# Patient Record
Sex: FEMALE | Race: BLACK OR AFRICAN AMERICAN
[De-identification: names, ages, dates, MRNs, and addresses within clinical notes are randomized per-mention and may not be internally consistent; named-entity substitution may affect disease eponyms.]

---

## 2016-09-07 VITALS — DIASTOLIC BLOOD PRESSURE: 62 MMHG | SYSTOLIC BLOOD PRESSURE: 103 MMHG

## 2018-02-06 ENCOUNTER — HOSPITAL ENCOUNTER (OUTPATIENT)
Dept: HOSPITAL 63 - US | Age: 68
Discharge: HOME | End: 2018-02-06
Attending: FAMILY MEDICINE
Payer: MEDICARE

## 2018-02-06 DIAGNOSIS — N32.89: ICD-10-CM

## 2018-02-06 DIAGNOSIS — R94.4: Primary | ICD-10-CM

## 2018-02-06 PROCEDURE — 76770 US EXAM ABDO BACK WALL COMP: CPT

## 2018-02-06 NOTE — RAD
Examination: Ultrasound kidneys



History: History of left flank pain for one week



Comparison: None available



Findings:



The right kidney measures 10.7 x 5.3 x 4.5 cm. The left kidney measures 10.5 x

5.8 x4.8 cm.



Bilateral ureteral jets are visualized in the urinary bladder.



Urinary bladder is mildly distended.



Impression:



Unremarkable visualized exam.

## 2021-09-30 ENCOUNTER — HOSPITAL ENCOUNTER (OUTPATIENT)
Dept: HOSPITAL 63 - ER | Age: 71
Setting detail: OBSERVATION
LOS: 1 days | Discharge: HOME | End: 2021-10-01
Attending: FAMILY MEDICINE | Admitting: FAMILY MEDICINE
Payer: COMMERCIAL

## 2021-09-30 VITALS — HEIGHT: 62 IN | BODY MASS INDEX: 36.31 KG/M2 | WEIGHT: 197.31 LBS

## 2021-09-30 VITALS — DIASTOLIC BLOOD PRESSURE: 76 MMHG | SYSTOLIC BLOOD PRESSURE: 118 MMHG

## 2021-09-30 VITALS — DIASTOLIC BLOOD PRESSURE: 61 MMHG | SYSTOLIC BLOOD PRESSURE: 105 MMHG

## 2021-09-30 DIAGNOSIS — I50.9: ICD-10-CM

## 2021-09-30 DIAGNOSIS — H26.9: ICD-10-CM

## 2021-09-30 DIAGNOSIS — R77.8: ICD-10-CM

## 2021-09-30 DIAGNOSIS — H40.9: ICD-10-CM

## 2021-09-30 DIAGNOSIS — Z93.3: ICD-10-CM

## 2021-09-30 DIAGNOSIS — M19.90: ICD-10-CM

## 2021-09-30 DIAGNOSIS — I11.0: ICD-10-CM

## 2021-09-30 DIAGNOSIS — E66.9: ICD-10-CM

## 2021-09-30 DIAGNOSIS — Z95.810: ICD-10-CM

## 2021-09-30 DIAGNOSIS — I47.1: Primary | ICD-10-CM

## 2021-09-30 DIAGNOSIS — E78.00: ICD-10-CM

## 2021-09-30 DIAGNOSIS — I25.10: ICD-10-CM

## 2021-09-30 DIAGNOSIS — E78.5: ICD-10-CM

## 2021-09-30 DIAGNOSIS — Z20.822: ICD-10-CM

## 2021-09-30 DIAGNOSIS — Z85.048: ICD-10-CM

## 2021-09-30 DIAGNOSIS — E11.9: ICD-10-CM

## 2021-09-30 LAB
ALBUMIN SERPL-MCNC: 3.5 G/DL (ref 3.4–5)
ALBUMIN/GLOB SERPL: 0.9 {RATIO} (ref 1–1.7)
ALP SERPL-CCNC: 73 U/L (ref 46–116)
ALT SERPL-CCNC: 27 U/L (ref 14–59)
ANION GAP SERPL CALC-SCNC: 9 MMOL/L (ref 6–14)
APTT PPP: YELLOW S
AST SERPL-CCNC: 19 U/L (ref 15–37)
BACTERIA #/AREA URNS HPF: 0 /HPF
BASOPHILS # BLD AUTO: 0 X10^3/UL (ref 0–0.2)
BASOPHILS NFR BLD: 0 % (ref 0–3)
BILIRUB SERPL-MCNC: 0.3 MG/DL (ref 0.2–1)
BILIRUB UR QL STRIP: (no result)
BUN/CREAT SERPL: 21 (ref 6–20)
CA-I SERPL ISE-MCNC: 19 MG/DL (ref 7–20)
CALCIUM SERPL-MCNC: 9.3 MG/DL (ref 8.5–10.1)
CHLORIDE SERPL-SCNC: 106 MMOL/L (ref 98–107)
CO2 SERPL-SCNC: 27 MMOL/L (ref 21–32)
CREAT SERPL-MCNC: 0.9 MG/DL (ref 0.6–1)
EOSINOPHIL NFR BLD: 0 % (ref 0–3)
EOSINOPHIL NFR BLD: 0 X10^3/UL (ref 0–0.7)
ERYTHROCYTE [DISTWIDTH] IN BLOOD BY AUTOMATED COUNT: 13.5 % (ref 11.5–14.5)
FIBRINOGEN PPP-MCNC: CLEAR MG/DL
GFR SERPLBLD BASED ON 1.73 SQ M-ARVRAT: 74.7 ML/MIN
GLOBULIN SER-MCNC: 3.7 G/DL (ref 2.2–3.8)
GLUCOSE SERPL-MCNC: 83 MG/DL (ref 70–99)
GLUCOSE UR STRIP-MCNC: (no result) MG/DL
HCT VFR BLD CALC: 39.4 % (ref 36–47)
HGB BLD-MCNC: 13.2 G/DL (ref 12–15.5)
HYALINE CASTS #/AREA URNS LPF: (no result) /HPF
LYMPHOCYTES # BLD: 1.9 X10^3/UL (ref 1–4.8)
LYMPHOCYTES NFR BLD AUTO: 18 % (ref 24–48)
MCH RBC QN AUTO: 33 PG (ref 25–35)
MCHC RBC AUTO-ENTMCNC: 33 G/DL (ref 31–37)
MCV RBC AUTO: 100 FL (ref 79–100)
MONO #: 0.8 X10^3/UL (ref 0–1.1)
MONOCYTES NFR BLD: 8 % (ref 0–9)
NEUT #: 7.8 X10^3UL (ref 1.8–7.7)
NEUTROPHILS NFR BLD AUTO: 74 % (ref 31–73)
NITRITE UR QL STRIP: (no result)
PLATELET # BLD AUTO: 268 X10^3/UL (ref 140–400)
POTASSIUM SERPL-SCNC: 3.7 MMOL/L (ref 3.5–5.1)
PROT SERPL-MCNC: 7.2 G/DL (ref 6.4–8.2)
RBC # BLD AUTO: 3.96 X10^6/UL (ref 3.5–5.4)
RBC #/AREA URNS HPF: 0 /HPF (ref 0–2)
SODIUM SERPL-SCNC: 142 MMOL/L (ref 136–145)
SP GR UR STRIP: 1.02
SQUAMOUS #/AREA URNS LPF: (no result) /LPF
UROBILINOGEN UR-MCNC: 0.2 MG/DL
WBC # BLD AUTO: 10.6 X10^3/UL (ref 4–11)
WBC #/AREA URNS HPF: (no result) /HPF (ref 0–4)
YEAST #/AREA URNS HPF: PRESENT /HPF

## 2021-09-30 PROCEDURE — 93005 ELECTROCARDIOGRAM TRACING: CPT

## 2021-09-30 PROCEDURE — 96374 THER/PROPH/DIAG INJ IV PUSH: CPT

## 2021-09-30 PROCEDURE — G0379 DIRECT REFER HOSPITAL OBSERV: HCPCS

## 2021-09-30 PROCEDURE — 99285 EMERGENCY DEPT VISIT HI MDM: CPT

## 2021-09-30 PROCEDURE — 71045 X-RAY EXAM CHEST 1 VIEW: CPT

## 2021-09-30 PROCEDURE — 80053 COMPREHEN METABOLIC PANEL: CPT

## 2021-09-30 PROCEDURE — U0003 INFECTIOUS AGENT DETECTION BY NUCLEIC ACID (DNA OR RNA); SEVERE ACUTE RESPIRATORY SYNDROME CORONAVIRUS 2 (SARS-COV-2) (CORONAVIRUS DISEASE [COVID-19]), AMPLIFIED PROBE TECHNIQUE, MAKING USE OF HIGH THROUGHPUT TECHNOLOGIES AS DESCRIBED BY CMS-2020-01-R: HCPCS

## 2021-09-30 PROCEDURE — 96361 HYDRATE IV INFUSION ADD-ON: CPT

## 2021-09-30 PROCEDURE — 36415 COLL VENOUS BLD VENIPUNCTURE: CPT

## 2021-09-30 PROCEDURE — G0378 HOSPITAL OBSERVATION PER HR: HCPCS

## 2021-09-30 PROCEDURE — 84484 ASSAY OF TROPONIN QUANT: CPT

## 2021-09-30 PROCEDURE — 87426 SARSCOV CORONAVIRUS AG IA: CPT

## 2021-09-30 PROCEDURE — 85025 COMPLETE CBC W/AUTO DIFF WBC: CPT

## 2021-09-30 PROCEDURE — 81001 URINALYSIS AUTO W/SCOPE: CPT

## 2021-09-30 RX ADMIN — Medication SCH MG: at 21:34

## 2021-09-30 NOTE — RAD
Study: XR CHEST 1V



Indication: Chest pain.



Comparison: 9/7/2016



Findings:



Left chest wall multilead pacer/AICD.



Unchanged configuration of the cardiomediastinal silhouette and debbie. Mildly increased basilar lung m
arkings favored in part from mild atelectasis. Linear density at the left lower lung is unchanged. Re
demonstrated blunting of left costophrenic angle. No lobar consolidation or pneumothorax.



Advanced arthrosis of both glenohumeral joints.



Impression:



No acute radiographic abnormality of the chest. Unchanged blunting of the left costophrenic angle fro
m a 2016 comparison is favored pleural thickening/epiphrenic fat as opposed to a small pleural effusi
on.



Electronically signed by: STAR BACH MD (9/30/2021 2:19 PM) [facility]

## 2021-09-30 NOTE — PHYS DOC
Past History


Past Medical History:  Cancer, CHF, Diabetes, Glaucoma, High Cholesterol, Heart 

Disease, Other


Additional Past Medical Histor:  cardiomyopathy,colorectal ca


Past Surgical History:  Other


Additional Past Surgical Histo:  pacer/drfib, colostomy bag andremoval


Alcohol Use:  None


Drug Use:  None





General Adult


EDM:


Chief Complaint:  AICD FIRED OR SHOCKED





HPI:


HPI:


71-year-old female presents after her AICD went off 3 times.  Patient was having

a verbal argument with her brother on the phone when she had the first shock.  

It went off 2 more times over the next couple of minutes.  She has not had a 

shock since that time.  This was about 30 minutes prior to arrival.  The patient

has never had her AICD go off before.  She sees a cardiologist at  regularly. 

Her pacer with defibrillator was installed in 2017.  She is unsure if she is 

paced full-time.  She denies fever or chills.  She states that she was feeling 

completely normal prior to this episode.





Review of Systems:


Review of Systems:


Constitutional:  Denies fever or chills 


Eyes:  Denies change in visual acuity 


HENT:  Denies nasal congestion or sore throat 


Respiratory:  Denies cough or shortness of breath 


Cardiovascular: AICD fired


GI:  Denies abdominal pain, nausea, vomiting, bloody stools or diarrhea 


: Denies dysuria 


Musculoskeletal:  Denies back pain or joint pain 


Integument:  Denies rash 


Neurologic:  Denies headache, focal weakness or sensory changes 


Endocrine:  Denies polyuria or polydipsia 


Lymphatic:  Denies swollen glands 


Psychiatric:  Denies depression or anxiety





Allergies:


Allergies:





Allergies








Coded Allergies Type Severity Reaction Last Updated Verified


 


  morphine Allergy Intermediate  9/30/21 Yes











Physical Exam:


PE:





Constitutional: Well developed, well nourished, obese, no acute distress, non-

toxic appearance. []


HENT: Normocephalic, atraumatic, bilateral external ears normal, oropharynx 

moist, no oral exudates, nose normal. []


Eyes: PERRLA, EOMI, conjunctiva normal, no discharge. [] 


Neck: Normal range of motion, no tenderness, supple, no stridor. [] 


Cardiovascular: Heart rate 108, paced rhythm, no murmur []


Lungs & Thorax:  Bilateral breath sounds clear to auscultation []


Abdomen: Bowel sounds normal, soft, no tenderness, no masses, no pulsatile 

masses. [] 


Skin: Warm, dry, no erythema, no rash. [] 


Back: No tenderness, no CVA tenderness. [] 


Extremities: No tenderness, no cyanosis, no clubbing, ROM intact, no edema. [] 


Neurologic: Alert and oriented X 3, normal motor function, normal sensory 

function, no focal deficits noted. []


Psychologic: Affect normal, judgement normal, mood normal. []





Current Patient Data:


Vital Signs:





                                   Vital Signs








  Date Time  Temp Pulse Resp B/P (MAP) Pulse Ox O2 Delivery O2 Flow Rate FiO2


 


9/30/21 12:05 98.6 103 22 142/91 (108) 100 Room Air  











EKG:


EKG:


Paced rhythm, rate 108, no significant ST elevation or depression.  []





Radiology/Procedures:


Radiology/Procedures:


[]


Impressions:


Study: XR CHEST 1V





Indication: Chest pain.





Comparison: 9/7/2016





Findings:





Left chest wall multilead pacer/AICD.





Unchanged configuration of the cardiomediastinal silhouette and debbie. Mildly 

increased basilar lung markings favored in part from mild atelectasis. Linear 

density at the left lower lung is unchanged. Redemonstrated blunting of left 

costophrenic angle. No lobar consolidation or pneumothorax.





Advanced arthrosis of both glenohumeral joints.





Impression:





No acute radiographic abnormality of the chest. Unchanged blunting of the left 

costophrenic angle from a 2016 comparison is favored pleural 

thickening/epiphrenic fat as opposed to a small pleural effusion.





Electronically signed by: STAR BACH MD (9/30/2021 2:19 PM) [facility]














DICTATED AND SIGNED BY:     STAR BACH MD


DATE:     09/30/21 1417





CC: PRINCESS LEÓN DO; ANTHONY DEMARCO MD ~MTH0 0





Heart Score:


C/O Chest Pain:  No


Risk Factors:


Risk Factors:  DM, Current or recent (<one month) smoker, HTN, HLP, family 

history of CAD, obesity.


Risk Scores:


Score 0 - 3:  2.5% MACE over next 6 weeks - Discharge Home


Score 4 - 6:  20.3% MACE over next 6 weeks - Admit for Clinical Observation


Score 7 - 10:  72.7% MACE over next 6 weeks - Early Invasive Strategies





Course & Med Decision Making:


Course & Med Decision Making


Pertinent Labs and Imaging studies reviewed. (See chart for details)


Patient's labs are unremarkable.  Her chest x-ray is negative for acute 

findings.  We interrogated the patient's AICD pacemaker and she seems to have 

had several episodes of SVT.  This would be consistent with how things look on 

the monitor at this time.  I have given her 20 of Cardizem and her heart rate is

 improved 85.  I consulted cardiology and spoke with the nurse practitioner, 

Taryn and she is recommended 50 mg of metoprolol and overnight admission for 

observation and management.  I spoke with Dr. Demarco and he has accepted the 

patient for admission.  I also spoke to the nurse for the patient's 

cardiologist, Dr. Roblero, and she has agreed with the plan of action.  We can 

consult them if needed.


[]





Dragon Disclaimer:


Dragon Disclaimer:


This electronic medical record was generated, in whole or in part, using a voice

 recognition dictation system.





Departure


Departure:


Impression:  


   Primary Impression:  


   SVT (supraventricular tachycardia)


   Additional Impression:  


   AICD discharge


Disposition:  09 ADMITTED AS INPATIENT


Admitting Physician:  Anthony Demarco


Condition:  STABLE


Referrals:  


ANTHONY DEMARCO MD (PCP)











PRINCESS LEÓN DO                 Sep 30, 2021 12:25

## 2021-09-30 NOTE — EKG
[facility] [address], [place] [address]

Test Date:    2021               Test Time:    13:24:00

Pat Name:     ESTIVEN CHICAS            Department:   

Patient ID:   [id number]           Room:          

Gender:       F                        Technician:   

:          [id number]               Requested By: PRINCESS LEÓN

Order Number: [id number]            Reading MD:     

                                 Measurements

Intervals                              Axis          

Rate:         101                      P:            41

AR:           124                      QRS:          142

QRSD:         80                       T:            37

QT:           392                                    

QTc:          509                                    

                           Interpretive Statements

SINUS TACHYCARDIA

ABNORMAL RIGHT AXIS DEVIATION

ST & T ABNORMALITY, CONSIDER RECENT

INFERIOR MYOCARDIAL OR PERICARDIAL DAMAGE

ABNORMAL ECG

RI6.02

No previous ECG available for comparison

## 2021-09-30 NOTE — EKG
[facility] [address], [place] [address]

Test Date:    2021               Test Time:    11:59:00

Pat Name:     ESTIVEN CHICAS            Department:   

Patient ID:   [id number]           Room:          

Gender:       F                        Technician:   

:          [id number]               Requested By: PRINCESS LEÓN

Order Number: [id number]            Reading MD:     

                                 Measurements

Intervals                              Axis          

Rate:         108                      P:            31

WV:           122                      QRS:          136

QRSD:         128                      T:            19

QT:           378                                    

QTc:          511                                    

                           Interpretive Statements

PACEMAKER SPIKES NOTED

RI6.02

No previous ECG available for comparison

## 2021-10-01 VITALS — DIASTOLIC BLOOD PRESSURE: 95 MMHG | SYSTOLIC BLOOD PRESSURE: 152 MMHG

## 2021-10-01 VITALS — SYSTOLIC BLOOD PRESSURE: 94 MMHG | DIASTOLIC BLOOD PRESSURE: 61 MMHG

## 2021-10-01 VITALS — DIASTOLIC BLOOD PRESSURE: 73 MMHG | SYSTOLIC BLOOD PRESSURE: 117 MMHG

## 2021-10-01 RX ADMIN — Medication SCH MG: at 08:44

## 2021-10-01 NOTE — NUR
PATIENT IS DISCHARGED HOME, DISCHARGED INSTRUCTION REVIEWED, PATIENT VERBALIZED 
UNDERSTANDING. PATIENT LEFT ROOM 113 VIA AMBULATION. PATIENT IS TAKEN HOME BY FRIEND VIA 
PERSONAL TRANSPORTATION.

## 2021-10-01 NOTE — PDOC2
CONSULT


DOS:


DATE: 10/1/21 


TIME: 14:17


Reason for Consult:


AICD discharge.


Referring Physician:


Dr. Sanchez


Chief Complaint


AICD discharge


Source:  Chart review, Patient


Problem List


Problems


Medical Problems:


(1) AICD discharge


Status: Acute  





(2) SVT (supraventricular tachycardia)


Status: Acute  








History of Present Illness


The patient is a 71-year-old female who was feeling in her usual state of health

last evening. Then she reportedly got into an argument with a family member and 

during this argument she had her AICD discharge 2 or 3 times. She was brought to

the emergency room. Chest x-ray showed an AICD in place but no acute 

cardiopulmonary changes. Initial potassium of 3.7. Troponin has been 0.042 and 

0.124. EKG showed a sinus rhythm with V paced beats. The patient reportedly had 

her device interrogated and showed episodes of PSVT but no ventricular t

achycardia. This morning she is comfortable. She denies chest pain, shortness of

breath, dizziness or lightheadedness. She has had no significant arrhythmias 

overnight.


Cardiovascular:  CAD, CHF, HTN, hyperipidemia


Heme/Onc:  Cancer


Endocrine:  Diabetes


Past Surgical History:  Other (AICD, colostomy)


Family History:  Hypertension


Smoke:  No


ALCOHOL:  none


Current Medications





Current Medications


Sodium Chloride 1,000 ml @  1,000 mls/hr 1X  ONCE IV  Last administered on 

9/30/21at 13:51;  Start 9/30/21 at 13:45;  Stop 9/30/21 at 14:44;  Status DC


Diltiazem HCl (Cardizem Iv Push) 20 mg 1X  ONCE IVP  Last administered on 

9/30/21at 15:19;  Start 9/30/21 at 15:15;  Stop 9/30/21 at 15:16;  Status DC


Metoprolol Succinate (Toprol Xl) 50 mg 1X  ONCE PO  Last administered on 

9/30/21at 16:36;  Start 9/30/21 at 16:15;  Stop 9/30/21 at 16:18;  Status DC


Metoprolol Succinate (Toprol Xl) 25 mg DAILY PO  Last administered on 10/1/21at 

08:44;  Start 10/1/21 at 09:00;  Stop 10/1/21 at 11:58;  Status DC


Non-Formulary Medication (Doxycycline Hyclate ) 100 mg BID PO  Last administered

on 10/1/21at 08:44;  Start 9/30/21 at 21:00;  Stop 10/1/21 at 11:58;  Status DC


Methylprednisolone (Medrol) 4 mg DAILY PO  Last administered on 10/1/21at 08:44;

 Start 10/1/21 at 09:00;  Stop 10/1/21 at 11:58;  Status DC


Promethazine HCl/ Codeine (Phenergan With Codeine Oral Syrup) 5 ml PRN Q4HRS  

PRN PO COUGH;  Start 9/30/21 at 21:15;  Stop 10/1/21 at 11:58;  Status DC


Non-Formulary Medication (Sacubitril/ Valsartan (Entresto 97 mg-103 mg Tablet)) 

1 each BID PO  Last administered on 10/1/21at 08:45;  Start 9/30/21 at 21:00;  

Stop 10/1/21 at 11:58;  Status DC


Spironolactone (Aldactone) 50 mg DAILY PO  Last administered on 10/1/21at 08:44;

 Start 10/1/21 at 09:00;  Stop 10/1/21 at 11:58;  Status DC


Zolpidem Tartrate (Ambien) 5 mg PRN QHS  PRN PO INSOMNIA, MAY REPEAT IN 1HR Last

administered on 9/30/21at 21:34;  Start 9/30/21 at 21:15;  Stop 10/1/21 at 

11:58;  Status DC


Dorzolamide/ Timolol (Cosopt) 1 drop BID OU  Last administered on 10/1/21at 

10:15;  Start 10/1/21 at 09:00;  Stop 10/1/21 at 11:58;  Status DC


Brimonidine Tartrate (Alphagan) 1 drop BID OU  Last administered on 10/1/21at 

10:15;  Start 10/1/21 at 09:00;  Stop 10/1/21 at 11:58;  Status DC


Latanoprost (Xalatan) 1 drop QHS OU ;  Start 10/1/21 at 21:00;  Stop 10/1/21 at 

11:58;  Status DC


Non-Formulary Medication (Doxycycline Hyclate ) 1 cap BID PO ;  Start 10/1/21 at

21:00;  Stop 10/1/21 at 11:58;  Status DC





Active Scripts


Active


Reported


Travatan Z (Travoprost) 5 Ml Drops 1 Drop EACHEYE QHS


Alphagan P (Brimonidine Tartrate) 5 Ml Drops 1 Drop EACHEYE BID


Dorzolamide-Timolol Eye Drops (Dorzolamide Hcl/Timolol Maleat) 10 Ml Drops 1 

Drop EACHEYE BID


Doxycycline Hyclate 100 Mg Capsule 1 Cap PO BID


Metoprolol Succinate ( Xl ) (Metoprolol Succinate) 25 Mg Tab.er.24h 1 Tab PO 

DAILY PRN


Prometh-Codein 6.25-10 mg/5 ml (Promethazine HCl/Codeine) 5 Ml Syrup 5 Ml PO PRN

Q4-6HRS PRN MDD 30 Milliliter(s)


Spironolactone 50 Mg Tablet 1 Tab PO DAILY


Methylprednisolone 4 Mg Tab.ds.pk 4 Mg PO DAILY


Entresto 97 mg-103 mg Tablet (Sacubitril/Valsartan) 1 Each Tablet 1 Each PO BID


Allergies:  


Coded Allergies:  


     morphine (Verified  Allergy, Intermediate, 9/30/21)


   Halluinations


Cardiovascular:  yes: Other (AICD discharge)


General:  No acute distress


HEENT:  Atraumatic


Lungs:  Clear to auscultation


Heart:  Regular rate


Abdomen:  Normal bowel sounds


VITALS





Vital Signs








  Date Time  Temp Pulse Resp B/P (MAP) Pulse Ox O2 Delivery O2 Flow Rate FiO2


 


10/1/21 11:22 97.5 79 18 152/95 (114)    


 


10/1/21 08:22      Room Air  


 


10/1/21 05:35     100   








Labs





Laboratory Tests








Test


 9/30/21


13:43 9/30/21


15:37 9/30/21


16:40 9/30/21


19:34


 


White Blood Count


 10.6 x10^3/uL


(4.0-11.0) 


 


 





 


Red Blood Count


 3.96 x10^6/uL


(3.50-5.40) 


 


 





 


Hemoglobin


 13.2 g/dL


(12.0-15.5) 


 


 





 


Hematocrit


 39.4 %


(36.0-47.0) 


 


 





 


Mean Corpuscular Volume


 100 fL


() 


 


 





 


Mean Corpuscular Hemoglobin 33 pg (25-35)    


 


Mean Corpuscular Hemoglobin


Concent 33 g/dL


(31-37) 


 


 





 


Red Cell Distribution Width


 13.5 %


(11.5-14.5) 


 


 





 


Platelet Count


 268 x10^3/uL


(140-400) 


 


 





 


Neutrophils (%) (Auto) 74 % (31-73)    


 


Lymphocytes (%) (Auto) 18 % (24-48)    


 


Monocytes (%) (Auto) 8 % (0-9)    


 


Eosinophils (%) (Auto) 0 % (0-3)    


 


Basophils (%) (Auto) 0 % (0-3)    


 


Neutrophils # (Auto)


 7.8 x10^3uL


(1.8-7.7) 


 


 





 


Lymphocytes # (Auto)


 1.9 x10^3/uL


(1.0-4.8) 


 


 





 


Monocytes # (Auto)


 0.8 x10^3/uL


(0.0-1.1) 


 


 





 


Eosinophils # (Auto)


 0.0 x10^3/uL


(0.0-0.7) 


 


 





 


Basophils # (Auto)


 0.0 x10^3/uL


(0.0-0.2) 


 


 





 


Sodium Level


 142 mmol/L


(136-145) 


 


 





 


Potassium Level


 3.7 mmol/L


(3.5-5.1) 


 


 





 


Chloride Level


 106 mmol/L


() 


 


 





 


Carbon Dioxide Level


 27 mmol/L


(21-32) 


 


 





 


Anion Gap 9 (6-14)    


 


Blood Urea Nitrogen


 19 mg/dL


(7-20) 


 


 





 


Creatinine


 0.9 mg/dL


(0.6-1.0) 


 


 





 


Estimated GFR


(Cockcroft-Gault) 74.7 


 


 


 





 


BUN/Creatinine Ratio 21 (6-20)    


 


Glucose Level


 83 mg/dL


(70-99) 


 


 





 


Calcium Level


 9.3 mg/dL


(8.5-10.1) 


 


 





 


Total Bilirubin


 0.3 mg/dL


(0.2-1.0) 


 


 





 


Aspartate Amino Transf


(AST/SGOT) 19 U/L (15-37) 


 


 


 





 


Alanine Aminotransferase


(ALT/SGPT) 27 U/L (14-59) 


 


 


 





 


Alkaline Phosphatase


 73 U/L


() 


 


 





 


Troponin I Quantitative


 0.042 ng/mL


(0-0.055) 


 


 0.124 ng/mL


(0-0.055)


 


Total Protein


 7.2 g/dL


(6.4-8.2) 


 


 





 


Albumin


 3.5 g/dL


(3.4-5.0) 


 


 





 


Albumin/Globulin Ratio 0.9 (1.0-1.7)    


 


Urine Collection Type  Unknown   


 


Urine Color  Yellow   


 


Urine Clarity  Clear   


 


Urine pH  5.5   


 


Urine Specific Gravity  1.025   


 


Urine Protein


 


 Neg


(NEG-TRACE) 


 





 


Urine Glucose (UA)


 


 Neg mg/dL


(NEG) 


 





 


Urine Ketones (Stick)


 


 Neg mg/dL


(NEG) 


 





 


Urine Blood  Neg (NEG)   


 


Urine Nitrite  Neg (NEG)   


 


Urine Bilirubin  Neg (NEG)   


 


Urine Urobilinogen Dipstick


 


 0.2 mg/dL (0.2


mg/dL) 


 





 


Urine Leukocyte Esterase  Neg (NEG)   


 


Urine RBC  0 /HPF (0-2)   


 


Urine WBC


 


 Rare /HPF


(0-4) 


 





 


Urine Squamous Epithelial


Cells 


 Occ /LPF 


 


 





 


Urine Bacteria  0 /HPF (0-FEW)   


 


Urine Hyaline Casts  Occ /HPF   


 


Urine Yeast  Present /HPF   


 


Coronavirus (COVID-19)(PCR)


 


 


 Not detected


(NOT DETECTD) 





 


SARS-CoV-2 Antigen (Rapid)


 


 


 Negative


(NEGATIVE) 











Images


Checks x-ray with no acute cardiopulmonary findings


Assessment/Plan


1. AICD. Several discharges as noted above during an argument. Interrogation 

reportedly shows PSVT but no VT. Patient rhythm has been stable overnight. Her 

potassium is 3.7. She has a mild troponin bump at 0.124 consistent with her 

discharges. Apparently the ER also contacted her  physicians. At this time we 

will continue present medications and increase her activities. Probably home 

later today with follow-up at .





2. Cardio myopathy. No acute heart failure or infarct. No infiltrates on chest 

x-ray. Continue present treatment with follow-up at .





3. History of coronary artery disease. No chest pain other than her discharge. 

Continue baseline medications.





4. Hyperlipidemia. Continue statin.





5. Diabetes mellitus. Continue present treatments.











ALEXX GOMES MD             Oct 1, 2021 14:22

## 2021-10-01 NOTE — HP
HISTORY OF PRESENT ILLNESS:  A 71-year-old -American female with history 

of severe cardiomyopathy and the like.  Apparently, had her AICD went off 3 

times and was unknown reason.  The patient in turn had told her to go to the 

Emergency Room after the third AICD went off.  The patient came in and had a 

couple of more times when she was shocked with her automatic defibrillator.  The

patient was admitted for further evaluation and treatment.  The patient denied 

chest pain, shortness of breath.



PAST MEDICAL HISTORY:  Significant for cataract, left eye glaucoma, congestive 

heart failure, cardiomyopathy, internal defibrillator, colorectal cancer, bowel 

surgery, colostomy with reversal, obesity, arthritis.



FAMILY HISTORY:  Positive for cancer, father and mother.



ALLERGIES:  MORPHINE.



SOCIAL HISTORY:  The patient denies smoking, alcohol or drug use.  Is a full 

code.



REVIEW OF SYSTEMS:  Denies any headaches, visual change, blurred vision, double 

vision.  Denies chest pain, shortness of breath.  Denies any abdominal pain.  

Denies any melena, hematochezia, hematemesis, and neurologically stable.



PHYSICAL EXAMINATION:

GENERAL:  This is a pleasant -American female in no apparent obvious 

distress at the present time.

VITAL SIGNS:  Blood pressure 110/70, respiratory 22, pulse 85, afebrile, oxygen 

saturation 99%.

HEENT:  The patient's head was atraumatic, normocephalic.  Eyes:  PERRLA without

jaundice.  The mouth and throat were normal.

NECK:  Supple.

LUNGS:  Basically clear to auscultation.

CARDIOVASCULAR:  Regular sinus rhythm, S1, S2, without murmur, rub, thrill, or 

extra heart sounds.

ABDOMEN:  The patient's abdomen was soft, diffuse tenderness.  No rebounding or 

guarding.  Positive bowel sounds, no hepatosplenomegaly was noted.

EXTREMITIES:  No clubbing, cyanosis.  Trace edema noted in the extremities, but 

otherwise basically unremarkable for this very nice individual.



ASSESSMENT AND PLAN:  In any case, paroxysmal supraventricular tachycardia, but 

no ventricular tachycardia, approximately 5 AICD shocks, mild troponin element, 

cardiomyopathy, no heart failure, history of coronary artery disease, 

hyperlipidemia, type 2 diabetes, under fairly good control.  We will continue to

monitor the patient.  She was seen by Dr. Sheffield, possibly discharged home.  

Labs were all basically normal except for her slight elevation of the troponin. 

Otherwise, sodium and potassium 142 and 3.7, BUN and creatinine 19 and 0.9 and 

blood sugar was only 83.  She was SARS negative.  The patient continued to be 

monitored as an outpatient and make further evaluation on her as indicated.







JOSE

DR: Humera   DD: 10/01/2021 18:49

DT: 10/01/2021 19:11   TID: [id number]